# Patient Record
Sex: MALE | URBAN - METROPOLITAN AREA
[De-identification: names, ages, dates, MRNs, and addresses within clinical notes are randomized per-mention and may not be internally consistent; named-entity substitution may affect disease eponyms.]

---

## 2022-01-10 ENCOUNTER — ATHLETIC TRAINING (OUTPATIENT)
Dept: SPORTS MEDICINE | Facility: OTHER | Age: 22
End: 2022-01-10

## 2022-01-10 DIAGNOSIS — M25.551 HIP PAIN, ACUTE, RIGHT: Primary | ICD-10-CM

## 2022-01-10 NOTE — PROGRESS NOTES
A: Right gluteus medius spasm  S: Pt  Has a past history of low back pain and surgery to repair a disc herniation  O: Completed MMT of GM, abdominals, hip flexor  Left gluteus medius was weaker than Right  - well leg test  No numbness or tingling  No mechanism of injury  P: Advised to completed three exercises to alleviate soreness on the Right side  He will complete exercises more regularly as he is returning to full participation    LB ATC

## 2022-07-12 ENCOUNTER — ATHLETIC TRAINING (OUTPATIENT)
Dept: SPORTS MEDICINE | Facility: OTHER | Age: 22
End: 2022-07-12

## 2022-07-12 DIAGNOSIS — M25.532 LEFT WRIST PAIN: Primary | ICD-10-CM

## 2022-07-12 NOTE — PROGRESS NOTES
Athletic Training Wrist/Hand Evaluation    Name: Rocael Tejeda  Age: 20 y o  Sport: Fittr   Date of Assessment: 7/12/2022    Assessment/Plan:     Visit Diagnosis: Left wrist pain, Scaphoid injury     Treatment Plan: Ath will go get x-rays and update AT on results  He will continue treatment  []  Follow-up PRN  []  Follow-up prior to next practice/game for re-evaluation  [x]  Daily treatment/rehab  Progress note expected weekly  Referral:   []  Not needed at this time  [x]  Referred to: Urgent care for x-ray   [x]  Coaching staff notified    Subjective: Ath was working a [employer] camp when he went up for a dunk and fell down on his left wrist in a extended position ([address])  He denies any popping or clicking noise, but was not able to return to play  He iced and compressed on and off throughout the night  Ath stated he noticed swelling immediately after the injury, but no discoloration   Ath was able to sleep last night, but states he has a painful ache at rest      Date of Injury: 7/11/22    Injury occurred during:   []  Practice  []  Competition  [x]  Other: Camp    Mechanism: [address]    Previous History: None    Reported Symptoms:     [] Hyperextension [] Numbness or tingling   [] Hyperflexion [] Weakness   [] Snapping sensation [] Grinding   [] Felt pop [] Sharp pain   [x] Pain with rest [] Burning   [x] Pain with activity [x] Dull or achy   [x] Loss of motion       Objective:    Observation:     []  No observable findings compared bilaterally    [x] Swelling [] Jersey finger   [] Ecchymosis [] Mallet finger   [] Atrophy [] Abnormal contours   [] Callous or blister [] Nail abnormality   [] Deformity [] Subungual hematoma   [] Boutonniere deformity [] Ingrown nail   [] [place] neck deformity [] Laceration     Palpation: TTP scaphoid, radial styloid process, and distal radius on palmar and dorsal aspect    Active Range of Motion:      Full  ROM Limited  ROM Pain  with  ROM No  Motion   Wrist Flexion [] [x] [] []   Wrist Extension [] [x] [] []   Pronation [] [] [x] []   Supination [] [] [x] []   Radial Deviation [] [] [x] []   Ulnar Deviation [] [x] [] []   Thumb Flexion [] [] [x] []   Thumb Extension [] [] [x] []   Thumb Abduction [] [] [x] []   Thumb Adduction [] [] [x] []   MP Flexion [x] [] [] []   MP Extension [x] [] [] []   PIP Flexion [x] [] [] []   PIP Extension [x] [] [] []   DIP Flexion [x] [] [] []   DIP Extension [x] [] [] []     Manual Muscle Tests:   Full ROM of fingers   No pain in fingers   Moves whole arm to supinate pronate    Not performed [x] Due to pain             5 4+ 4 4- 3 or  Under   Wrist Flexion [] [] [] [] []   Wrist Extension [] [] [] [] []   Pronation [] [] [] [] []   Supination [] [] [] [] []   Radial Deviation [] [] [] [] []   Ulnar Deviation [] [] [] [] []   Thumb Flexion [] [] [] [] []   Thumb Extension [] [] [] [] []   Thumb Abduction [] [] [] [] []   Thumb Adduction [] [] [] [] []   MP Flexion [] [] [] [] []   MP Extension [] [] [] [] []   PIP Flexion [] [] [] [] []   PIP Extension [] [] [] [] []   DIP Flexion [] [] [] [] []   DIP Extension [] [] [] [] []     Special Tests: Unable to complete majority of tests due to pain      (+)  Laxity (+)  Pain (-)  WNL Not  Tested   Compression [] [x] [] []   Distraction [] [] [] []   Percussion [] [] [] []   Tuning Fork [] [] [x] []   Valgus Stress [] [] [] []   Varus Stress [] [] [] []   Wrist Glide [] [] [] []   Tinel's [] [] [] []   Phalen's [] [] [] []   Reverse Phalen's [] [] [] []   Finkelstein's [] [] [] []   Thomas Scaphoid Shift [] [] [] []   Triangular Fibrocartilage [] [] [] []   Lunotriquetrial Shear [] [] [] []     Treatment Log:     Date: 7/12/22   Playing Status: Out       Exercise/Treatment    X-ray

## 2022-07-22 ENCOUNTER — ATHLETIC TRAINING (OUTPATIENT)
Dept: SPORTS MEDICINE | Facility: OTHER | Age: 22
End: 2022-07-22

## 2022-07-22 DIAGNOSIS — M25.532 LEFT WRIST PAIN: Primary | ICD-10-CM

## 2022-07-26 NOTE — PROGRESS NOTES
Athletic Training Progress Note    Name: Martha Santos  Age: 20 y o  Assessment/Plan:     Visit Diagnosis: Left wrist pain [M25 532]    Treatment Plan: ROM, Decrease Pain and Swelling    []  Follow-up PRN  []  Follow-up prior to next practice/game for re-evaluation  [x]  Daily treatment/rehab  Progress note expected weekly  Subjective: Pt reported to the [facility] today with their results from getting an x-ray  X-ray was negative but may have a hidden scaphoid fx  Pt stated he was given a brace to wear but has not been able to wear it due to work  Pt stated his ROM has gotten better but is still limited  Objective:   See treatment log below  Provided compression sleeve to wear at work since they are unable to wear the brace  Treatment Log:      Date: 7/22/22       Playing Status: Limited               Exercise/Treatment        Rice Bucket ROM x50                                                                                         Pt should complete 1 week of rehab and treatment and then re-evaluated and getting another X-ray if symptoms have not improved    CY LAT ATC

## 2022-07-27 ENCOUNTER — ATHLETIC TRAINING (OUTPATIENT)
Dept: SPORTS MEDICINE | Facility: OTHER | Age: 22
End: 2022-07-27

## 2022-07-27 DIAGNOSIS — S66.912D MUSCLE STRAIN OF LEFT WRIST, SUBSEQUENT ENCOUNTER: ICD-10-CM

## 2022-07-27 DIAGNOSIS — M25.532 LEFT WRIST PAIN: Primary | ICD-10-CM

## 2022-07-27 NOTE — PROGRESS NOTES
Name: Ale Blanca  Age: 20 y o  Assessment/Plan:     Visit Diagnosis: No primary diagnosis found  Treatment Plan: ROM, Decrease Pain and Swelling    []  Follow-up PRN  []  Follow-up prior to next practice/game for re-evaluation  [x]  Daily treatment/rehab  Progress note expected weekly  Subjective: Pt reported to the [facility] today with their results from getting an x-ray  X-ray was negative but may have a hidden scaphoid fx  Pt stated he was given a brace to wear but has not been able to wear it due to work  Pt stated his ROM has gotten better but is still limited  Objective:   See treatment log below  Provided compression sleeve to wear at work since they are unable to wear the brace  Treatment Log:      Date: 7/22/22       Playing Status: Limited               Exercise/Treatment        Rice Bucket ROM x50       MWM Flexion 3x4       MWM Wall pushup 1x5                                                                         Pt should complete 1 week of rehab and treatment and then re-evaluated and getting another X-ray if symptoms have not improved  CY LAT ATC      Pain decreased in wall pushup from 4 to 1 NRS    LB ATC 7/27/22

## 2022-08-05 ENCOUNTER — ATHLETIC TRAINING (OUTPATIENT)
Dept: SPORTS MEDICINE | Facility: OTHER | Age: 22
End: 2022-08-05

## 2022-08-05 DIAGNOSIS — S66.912D MUSCLE STRAIN OF LEFT WRIST, SUBSEQUENT ENCOUNTER: ICD-10-CM

## 2022-08-05 DIAGNOSIS — M25.532 LEFT WRIST PAIN: Primary | ICD-10-CM

## 2022-08-05 NOTE — PROGRESS NOTES
Name: Helen Stout  Age: 20 y o  Assessment/Plan:     Visit Diagnosis: No primary diagnosis found  Treatment Plan: ROM, Decrease Pain and Swelling    []  Follow-up PRN  []  Follow-up prior to next practice/game for re-evaluation  [x]  Daily treatment/rehab  Progress note expected weekly  Subjective: Pt reported to the [facility] today with their results from getting an x-ray  X-ray was negative but may have a hidden scaphoid fx  Pt stated he was given a brace to wear but has not been able to wear it due to work  Pt stated his ROM has gotten better but is still limited  Objective:   See treatment log below  Provided compression sleeve to wear at work since they are unable to wear the brace  Treatment Log:      Date: 7/22/22 8/5/22      Playing Status: Limited As Tolerated no games              Exercise/Treatment        Rice Bucket ROM x50 x50      MWM Flexion 3x4       MWM Wall pushup 1x5       Finger lifts off wall  x10      Finger flexion to wall  x5                                                        Pt should complete 1 week of rehab and treatment and then re-evaluated and getting another X-ray if symptoms have not improved  CY LAT ATC      Pain decreased in wall pushup from 4 to 1 NRS  LB ATC 7/27/22      NRS with Pushup on ground 3 wall 2 NRS  He is not concerned about getting a MRI now    LB ATC 8/5/22

## 2022-08-10 ENCOUNTER — ATHLETIC TRAINING (OUTPATIENT)
Dept: SPORTS MEDICINE | Facility: OTHER | Age: 22
End: 2022-08-10

## 2022-08-10 DIAGNOSIS — S66.912D MUSCLE STRAIN OF LEFT WRIST, SUBSEQUENT ENCOUNTER: ICD-10-CM

## 2022-08-10 DIAGNOSIS — M25.532 LEFT WRIST PAIN: Primary | ICD-10-CM

## 2022-08-11 NOTE — PROGRESS NOTES
Athletic Training Progress Note    Name: Stephanie Garcia  Age: 20 y o  Assessment/Plan:     Visit Diagnosis: Left wrist pain [M25 532]    Treatment Plan: Plan RTP based on one on one practice    []  Follow-up PRN  [x]  Follow-up prior to next practice/game for re-evaluation  []  Daily treatment/rehab  Progress note expected weekly  Subjective: Pt would like to play on Monday 8/15 in summer ball game  He will play one on one before that  Objective:   See treatment log below  0 NRS with pushup  Full motion    Treatment Log:    Date: 7/22/22 8/5/22 8/10     Playing Status: Limited As Tolerated no games As tolerated             Exercise/Treatment        Rice Bucket ROM x50 x50 x50     MWM Flexion 3x4       MWM Wall pushup 1x5       Finger lifts off wall  x10      Finger flexion to wall  x5                                                        Pt should complete 1 week of rehab and treatment and then re-evaluated and getting another X-ray if symptoms have not improved  CY LAT ATC    Name: Stephanie Garcia  Age: 20 y o  Assessment/Plan:     Visit Diagnosis: Left wrist pain [M25 532]    Treatment Plan: ROM, Decrease Pain and Swelling    []  Follow-up PRN  []  Follow-up prior to next practice/game for re-evaluation  [x]  Daily treatment/rehab  Progress note expected weekly  Subjective: Pt reported to the [facility] today with their results from getting an x-ray  X-ray was negative but may have a hidden scaphoid fx  Pt stated he was given a brace to wear but has not been able to wear it due to work  Pt stated his ROM has gotten better but is still limited  Objective:   See treatment log below  Provided compression sleeve to wear at work since they are unable to wear the brace    Treatment Log:      Date: 7/22/22 8/5/22      Playing Status: Limited As Tolerated no games              Exercise/Treatment        Rice Bucket ROM x50 x50      MWM Flexion 3x4       MWM Wall pushup 1x5       Finger lifts off wall  x10 Finger flexion to wall  x5                                                        Pt should complete 1 week of rehab and treatment and then re-evaluated and getting another X-ray if symptoms have not improved  CY LAT ATC      Pain decreased in wall pushup from 4 to 1 NRS  LB ATC 7/27/22      NRS with Pushup on ground 3 wall 2 NRS  He is not concerned about getting a MRI now     ATC 8/5/22

## 2022-08-16 ENCOUNTER — ATHLETIC TRAINING (OUTPATIENT)
Dept: SPORTS MEDICINE | Facility: OTHER | Age: 22
End: 2022-08-16

## 2022-08-16 DIAGNOSIS — M62.830 SPASM OF MUSCLE OF LOWER BACK: ICD-10-CM

## 2022-08-16 DIAGNOSIS — M54.41 ACUTE RIGHT-SIDED LOW BACK PAIN WITH RIGHT-SIDED SCIATICA: Primary | ICD-10-CM

## 2022-08-16 NOTE — PROGRESS NOTES
Athletic Training Back Evaluation    Name: Nanda Antony  Age: 20 y o  Date of Assessment: 8/16/2022    Assessment/Plan:     Visit Diagnosis: Acute right-sided low back pain with right-sided sciatica [M54 41]    Treatment Plan: Decrease pain and work to mobilize lumbar spine  []  Follow-up PRN  []  Follow-up prior to next practice/game for re-evaluation  [x]  Daily treatment/rehab  Progress note expected weekly  Referral:     [x]  Not needed at this time  []  Referred to:     [x]  Coaching staff notified  []  Parent/Guardian Notified    Subjective:    Date of Injury: 8/14/22    Injury occurred during:     []  Practice  []  Competition  [x]  Other:     Mechanism: No known    Previous History: Back surgery, narrowing of vertabre    Reported Symptoms:     [x] Pain with rest [] Numbness or tingling   [x] Pain with activity [] Radiating pain   [x] Pain with sleeping [] Weakness   [] Sharp pain [] Loss of motion   [x] Dull pain [] Twisted   [] Pressure [] Felt/heard a pop   [] Burning [] [place]/heard a crack     Objective:    Observation:     []  No observable findings compared bilaterally    [] Swelling [] Pelvic tilt   [] Deformity [] Spine curvature   [] Ecchymosis [] Winged scapula   [x] Muscle spasm [] Abnormal posture   [] Abnormal gait [] Atrophy     Palpation: TTP: Right SI and cause shooting pain      Active Range of Motion:      Full  ROM Limited  ROM Pain  with  ROM No  Motion   Trunk Flexion  [] [x] [] []   Trunk Extension [] [x] [] []   Lateral Flexion (Left) [] [] [] []   Lateral Flexion (Right) [] [] [] []   Trunk Rotation (Left) [] [] [] []   Trunk Rotation (Right) [] [] [] []   Hip Flexion [] [] [] []   Hip Extension [] [] [] []     Manual Muscle Tests:     Not performed []             5 4+ 4 4- 3 or  Under   Trunk Flexion  [] [] [] [] []   Trunk Extension [] [] [] [] []   Lateral Flexion (Left) [] [] [] [] []   Lateral Flexion (Right) [] [] [] [] []   Trunk Rotation (Left) [] [] [] [] []   Trunk Rotation (Right) [] [] [] [] []   Hip Flexion [] [] [] [] []   Hip Extension [] [] [] [] []     Special Tests:      (+)  POS (-)  NEG Not  Tested   Spring Test [] [] []   Straight Leg Raise [] [x] []   Valsalva [] [x] []   Milgram's [] [] []   Kernig's/Brudzinski's []  [] []   Slump [] [x] []   Quadrant [] [] []   Bowstring [] [x] []   SI Compression/Distraction [] [] []   ALEA [] [] []   Long Sit Test [] [] []   Trendelenberg's  [] [] []   Hernandez [] [] []     Lower Quarter Screen:  []  WNL  []  Abnormal    Treatment Log:     Date: 8/16   Playing Status: OUT       Exercise/Treatment    MHP x15'   Estim Premod x15'                                       LB ATC

## 2022-08-17 ENCOUNTER — ATHLETIC TRAINING (OUTPATIENT)
Dept: SPORTS MEDICINE | Facility: OTHER | Age: 22
End: 2022-08-17

## 2022-08-17 DIAGNOSIS — S66.912D MUSCLE STRAIN OF LEFT WRIST, SUBSEQUENT ENCOUNTER: ICD-10-CM

## 2022-08-17 DIAGNOSIS — M54.41 ACUTE RIGHT-SIDED LOW BACK PAIN WITH RIGHT-SIDED SCIATICA: Primary | ICD-10-CM

## 2022-08-17 DIAGNOSIS — M25.532 LEFT WRIST PAIN: Primary | ICD-10-CM

## 2022-08-17 DIAGNOSIS — M62.830 SPASM OF MUSCLE OF LOWER BACK: ICD-10-CM

## 2022-08-18 NOTE — PROGRESS NOTES
Athletic Training Back Evaluation    Name: Leeanne Velasco  Age: 20 y o  Date of Assessment: 8/17/2022    Assessment/Plan:     Visit Diagnosis: Acute right-sided low back pain with right-sided sciatica [M54 41]    Treatment Plan: Decrease pain and work to mobilize lumbar spine  []  Follow-up PRN  []  Follow-up prior to next practice/game for re-evaluation  [x]  Daily treatment/rehab  Progress note expected weekly  Referral:     [x]  Not needed at this time  []  Referred to:     [x]  Coaching staff notified  []  Parent/Guardian Notified    Subjective:    Date of Injury: 8/14/22    Injury occurred during:     []  Practice  []  Competition  [x]  Other:     Mechanism: No known    Previous History: Back surgery, narrowing of vertabre    Reported Symptoms:     [x] Pain with rest [] Numbness or tingling   [x] Pain with activity [] Radiating pain   [x] Pain with sleeping [] Weakness   [] Sharp pain [] Loss of motion   [x] Dull pain [] Twisted   [] Pressure [] Felt/heard a pop   [] Burning [] [place]/heard a crack     Objective:    Observation:     []  No observable findings compared bilaterally    [] Swelling [] Pelvic tilt   [] Deformity [] Spine curvature   [] Ecchymosis [] Winged scapula   [x] Muscle spasm [] Abnormal posture   [] Abnormal gait [] Atrophy     Palpation: TTP: Right SI and cause shooting pain      Active Range of Motion:      Full  ROM Limited  ROM Pain  with  ROM No  Motion   Trunk Flexion  [] [x] [] []   Trunk Extension [] [x] [] []   Lateral Flexion (Left) [] [] [] []   Lateral Flexion (Right) [] [] [] []   Trunk Rotation (Left) [] [] [] []   Trunk Rotation (Right) [] [] [] []   Hip Flexion [] [] [] []   Hip Extension [] [] [] []     Manual Muscle Tests:     Not performed []             5 4+ 4 4- 3 or  Under   Trunk Flexion  [] [] [] [] []   Trunk Extension [] [] [] [] []   Lateral Flexion (Left) [] [] [] [] []   Lateral Flexion (Right) [] [] [] [] []   Trunk Rotation (Left) [] [] [] [] []   Trunk Rotation (Right) [] [] [] [] []   Hip Flexion [] [] [] [] []   Hip Extension [] [] [] [] []     Special Tests:      (+)  POS (-)  NEG Not  Tested   Spring Test [] [] []   Straight Leg Raise [] [x] []   Valsalva [] [x] []   Milgram's [] [] []   Kernig's/Brudzinski's []  [] []   Slump [] [x] []   Quadrant [] [] []   Bowstring [] [x] []   SI Compression/Distraction [] [] []   ALEA [] [] []   Long Sit Test [] [] []   Trendelenberg's  [] [] []   Hernandez [] [] []     Lower Quarter Screen:  []  WNL  []  Abnormal    Treatment Log:     Date: 8/16   Playing Status: OUT       Exercise/Treatment    MHP x15'   Estim Premod x15'                                       LB ATC    Athletic Training Progress Note    Name: Bartolo Sprague  Age: 20 y o  Assessment/Plan:     Visit Diagnosis: Acute right-sided low back pain with right-sided sciatica [M54 41]    Treatment Plan: Improve lumbopelvic [facility]    []  Follow-up PRN  []  Follow-up prior to next practice/game for re-evaluation  [x]  Daily treatment/rehab  Progress note expected weekly  Subjective: Pt states that his back feels much looser after treatment yesterday  He was going to lift after treatment to see how it feels      Objective:   See treatment log below    Treatment Log:     Date: 8/17/22       Playing Status: As tolerated               Exercise/Treatment        DNS elbows 2x30"       DNS knees up 2x30"       Premod  1-100mhz 13'       MHP 13'                                                                 Date: 8/16   Playing Status: OUT       Exercise/Treatment    MHP x15'   Estim Premod x15'                                       LB ATC

## 2022-08-18 NOTE — PROGRESS NOTES
8/17/22  Pt was able to compete fully during the summer league game he wanted to participate in on 8/15/22    LB ATC

## 2022-08-20 ENCOUNTER — ATHLETIC TRAINING (OUTPATIENT)
Dept: SPORTS MEDICINE | Facility: OTHER | Age: 22
End: 2022-08-20

## 2022-08-20 DIAGNOSIS — M54.41 ACUTE RIGHT-SIDED LOW BACK PAIN WITH RIGHT-SIDED SCIATICA: Primary | ICD-10-CM

## 2022-08-20 DIAGNOSIS — M62.830 SPASM OF MUSCLE OF LOWER BACK: ICD-10-CM

## 2022-08-20 NOTE — PROGRESS NOTES
Athletic Training Back Evaluation    Name: Mena Stoner  Age: 20 y o  Date of Assessment: 8/20/2022    Assessment/Plan:     Visit Diagnosis: Acute right-sided low back pain with right-sided sciatica [M54 41]    Treatment Plan: Decrease pain and work to mobilize lumbar spine  []  Follow-up PRN  []  Follow-up prior to next practice/game for re-evaluation  [x]  Daily treatment/rehab  Progress note expected weekly  Referral:     [x]  Not needed at this time  []  Referred to:     [x]  Coaching staff notified  []  Parent/Guardian Notified    Subjective:    Date of Injury: 8/14/22    Injury occurred during:     []  Practice  []  Competition  [x]  Other:     Mechanism: No known    Previous History: Back surgery, narrowing of vertabre    Reported Symptoms:     [x] Pain with rest [] Numbness or tingling   [x] Pain with activity [] Radiating pain   [x] Pain with sleeping [] Weakness   [] Sharp pain [] Loss of motion   [x] Dull pain [] Twisted   [] Pressure [] Felt/heard a pop   [] Burning [] [place]/heard a crack     Objective:    Observation:     []  No observable findings compared bilaterally    [] Swelling [] Pelvic tilt   [] Deformity [] Spine curvature   [] Ecchymosis [] Winged scapula   [x] Muscle spasm [] Abnormal posture   [] Abnormal gait [] Atrophy     Palpation: TTP: Right SI and cause shooting pain      Active Range of Motion:      Full  ROM Limited  ROM Pain  with  ROM No  Motion   Trunk Flexion  [] [x] [] []   Trunk Extension [] [x] [] []   Lateral Flexion (Left) [] [] [] []   Lateral Flexion (Right) [] [] [] []   Trunk Rotation (Left) [] [] [] []   Trunk Rotation (Right) [] [] [] []   Hip Flexion [] [] [] []   Hip Extension [] [] [] []     Manual Muscle Tests:     Not performed []             5 4+ 4 4- 3 or  Under   Trunk Flexion  [] [] [] [] []   Trunk Extension [] [] [] [] []   Lateral Flexion (Left) [] [] [] [] []   Lateral Flexion (Right) [] [] [] [] []   Trunk Rotation (Left) [] [] [] [] []   Trunk Rotation (Right) [] [] [] [] []   Hip Flexion [] [] [] [] []   Hip Extension [] [] [] [] []     Special Tests:      (+)  POS (-)  NEG Not  Tested   Spring Test [] [] []   Straight Leg Raise [] [x] []   Valsalva [] [x] []   Milgram's [] [] []   Kernig's/Brudzinski's []  [] []   Slump [] [x] []   Quadrant [] [] []   Bowstring [] [x] []   SI Compression/Distraction [] [] []   ALEA [] [] []   Long Sit Test [] [] []   Trendelenberg's  [] [] []   Hernandez [] [] []     Lower Quarter Screen:  []  WNL  []  Abnormal    Treatment Log:     Date: 8/16   Playing Status: OUT       Exercise/Treatment    MHP x15'   Estim Premod x15'                                       LB ATC    Athletic Training Progress Note    Name: Mar Bell  Age: 20 y o  Assessment/Plan:     Visit Diagnosis: Acute right-sided low back pain with right-sided sciatica [M54 41]    Treatment Plan: Improve lumbopelvic [facility]    []  Follow-up PRN  []  Follow-up prior to next practice/game for re-evaluation  [x]  Daily treatment/rehab  Progress note expected weekly  Subjective: Pt states that his back feels much looser after treatment yesterday  He was going to lift after treatment to see how it feels  Objective:   See treatment log below    Treatment Log:     Date: 8/20/22 8/17/22       Playing Status: As Tolerated As tolerated                Exercise/Treatment         DNS elbows 3x10" 2x30"       DNS knees up 3x10" 2x30"       Premod  13' 1-100mhz 13'       MHP 13' 13'       Rolling patterns 3x10"                                                                Date: 8/16   Playing Status: OUT       Exercise/Treatment    MHP x15'   Estim Premod x15'                                       LB ATC     8/20  Would like to reevaluate Special tests and Athlete Low Back Outcome    LB ATC

## 2022-11-08 ENCOUNTER — ATHLETIC TRAINING (OUTPATIENT)
Dept: SPORTS MEDICINE | Facility: OTHER | Age: 22
End: 2022-11-08

## 2022-11-08 DIAGNOSIS — M76.62 ACHILLES TENDINITIS OF LEFT LOWER EXTREMITY: Primary | ICD-10-CM

## 2022-11-09 NOTE — PROGRESS NOTES
Athletic Training Foot/Ankle Evaluation    Name: Yimi Palacio  Age: 20 y o    School District: [address] [place] [address]   Sport: Basketball  Date of Assessment: 11/8/2022    Assessment/Plan:     Visit Diagnosis: Achilles tendinitis of left lower extremity [M76 62]    Treatment Plan:     [x]  Follow-up PRN  []  Follow-up prior to next practice/game for re-evaluation  []  Daily treatment/rehab  Progress note expected weekly       Referral:     []  Not needed at this time  []  Referred to:     [x]  Coaching staff notified  []  Parent/Guardian Notified    Subjective:    Date of Injury: 11/8    Injury occurred during:     []  Practice  []  Competition  []  Other: Overuse     Mechanism: Overuse    Previous History: Previous ankle sprains    Reported Symptoms: Pain with full ankle dorsiflexion and jumping/landing    [] Felt pop [] Weakness   [] Cracking or snapping [] Grinding   [] Twisted [] Sharp pain   [] Pain with rest [] Burning   [x] Pain with activity [] Dull or achy   [] Pain with stairs [] Felt give way   [] Numbness or tingling [] Loss of motion     Objective:    Observation:     [x]  No observable findings compared bilaterally    [] Swelling [] Callous or blister   [] Ecchymosis [] Nail abnormality   [] Redness [] Ingrown nail   [] Deformity [] Bunion formation   [] Abnormal gait [] Pes planus   [] Pitting edema [] Pes cavus   [] Open wound [] Atrophy     Palpation: Not TTP     Active Range of Motion:      Full  ROM Limited  ROM Pain  with  ROM No  Motion   Dorsiflexion [] [x] [] []   Plantarflexion [x] [] [] []   Inversion [x] [] [] []   Eversion [x] [] [] []   Great Toe Flexion [] [] [] []   Great Toe Extension [] [] [] []   Toe Flexion [] [] [] []   Toe Extension [] [] [] []     Manual Muscle Tests:     Not performed []             5 4+ 4 4- 3 or  Under   Dorsiflexion [x] [] [] [] []   Plantarflexion [x] [] [] [] []   Inversion [x] [] [] [] []   Eversion [x] [] [] [] []   Great Toe Flexion [] [] [] [] []   CHARLY Graf Toe Extension [] [] [] [] []   Toe Flexion [] [] [] [] []   Toe Extension [] [] [] [] []     Special Tests:      (+)  Laxity (+)  Pain (-)  WNL Not  Tested   Bump [] [] [] []   Squeeze [] [] [] []   Percussion [] [] [] []   Tuning Fork [] [] [] []   Anterior Drawer [] [] [] []   Posterior Drawer [] [] [] []   Talar Tilt - Inversion [] [] [] []   Talar Tilt - Eversion [] [] [] []   Kleiger [] [] [] []   Toe Compression [] [] [] []   Toe Distraction [] [] [] []   MTP Valgus [] [] [] []   MTP Varus [] [] [] []   Intermetatarsal Glide [] [] [] []   Tarsometatarsal Glide [] [] [] []   Tinel's [] [] [] []   Impingement Sign [] [] [] []   Solo's (Achilles) [] [] [x] []   Umm's Sign (DVT) [] [] [] []   Interdigital Neuroma [] [] [] []   Navicular Drop [] [] [] []     Treatment Log:    Date: 11/8   Playing Status: Full Go       Exercise/Treatment    Heat 5'   Standing Calf raises 3x12   Isolytic contractions 3x8   IASTM 5 mins No

## 2022-11-10 ENCOUNTER — ATHLETIC TRAINING (OUTPATIENT)
Dept: SPORTS MEDICINE | Facility: OTHER | Age: 22
End: 2022-11-10

## 2022-11-10 DIAGNOSIS — M76.62 ACHILLES TENDINITIS OF LEFT LOWER EXTREMITY: Primary | ICD-10-CM

## 2022-12-02 NOTE — PROGRESS NOTES
11/10:  Pt has not completed rehabilitation for his achilles  He completes all basketball activities as tolerated    CM

## 2022-12-18 ENCOUNTER — ATHLETIC TRAINING (OUTPATIENT)
Dept: SPORTS MEDICINE | Facility: OTHER | Age: 22
End: 2022-12-18

## 2022-12-18 DIAGNOSIS — M62.89 QUADRICEP TIGHTNESS: Primary | ICD-10-CM

## 2022-12-19 NOTE — PROGRESS NOTES
Athletic Training Progress Note    Name: Sandie Magdaleno  Age: 20 y o  Assessment/Plan:     Visit Diagnosis: Quadricep tightness [M62 89]    Treatment Plan:    [x]  Follow-up PRN  []  Follow-up prior to next practice/game for re-evaluation  []  Daily treatment/rehab  Progress note expected weekly  Subjective: Athlete states the he is experiencing pain hear his near  Has no KHAI that he can remember  Describes his discomfort as "tight"  Objective:   Not TTP and full strength  All negative special tests  Treatment Log:  PT was advised to make appts   To limit his tightness and discomfort during participation   Date:        Playing Status:                Exercise/Treatment

## 2023-04-30 ENCOUNTER — ATHLETIC TRAINING (OUTPATIENT)
Dept: SPORTS MEDICINE | Facility: OTHER | Age: 23
End: 2023-04-30

## 2023-04-30 DIAGNOSIS — S05.31XA: Primary | ICD-10-CM

## 2023-05-01 NOTE — PROGRESS NOTES
"4/20: S: pt states that he was poked in the eye 2 days ago playing  basketball  He was advised to stop playing if he was having vision problems  He continued to play and lifted after the injury  He did not seek further care  He states that he was unable to sleep last night due to his eye  O: Pt presents today with eye redness on the left side of his eye  He states that he has full vision but his eye almendarez \"all of the time\"  He states that touching his eye makes it feel better  A: Eye Laceration  P: Pt was told to seek further medical evaluation  He states that he does not have medical insurance now that the basketball season has ended  He states that he will seek further medical evaluation today  He was told to limit the amount that  He touches his eye to avoid potential infection  He was also told to wear sun glasses  He will send AT an email this evening to follow-up on what dr Ru You and prescribes  CM     4/30:  Pt visited his PCP who gave him some \"cream\" to put on his eye  He has not followed up care with AT staff and has been playing basketball fully    CM  "

## 2023-05-04 ENCOUNTER — ATHLETIC TRAINING (OUTPATIENT)
Dept: SPORTS MEDICINE | Facility: OTHER | Age: 23
End: 2023-05-04

## 2023-05-04 DIAGNOSIS — M76.899 HAMSTRING TENDONITIS: Primary | ICD-10-CM

## 2023-05-04 NOTE — PROGRESS NOTES
Athletic Training Knee Evaluation    Name: Chely Leroy  Age: 24 y o    School District: [employer]  Sport: Basketball  Date of Assessment: 5/4/2023    Assessment/Plan:     Visit Diagnosis: Hamstring tendonitis [M76 899]    Treatment Plan:     []  Follow-up PRN  []  Follow-up prior to next practice/game for re-evaluation  [x]  Daily treatment/rehab  Progress note expected weekly       Referral:     [x]  Not needed at this time  []  Referred to:     []  Coaching staff notified  []  Parent/Guardian Notified    Subjective:    Date of Injury: 5/3/23    Injury occurred during:     []  Practice  []  Competition  [x]  Other:     Mechanism: Felt pain after a jump    Previous History: N/a    Reported Symptoms:     [] Felt pop [] Grinding   [] Vergia Bias a pop [] Pressure   [x] Pain with rest [] Burning   [x] Pain with activity [] Weakness   [x] Pain with stairs [] Loss of motion   [x] Sharp pain [] Clicking   [] Dull pain [] Snapping sensation   [] Radiating pain [] Locking   [] Felt give way       Objective:    Observation:     [x]  No observable findings compared bilaterally    [] Swelling [] Genu recurvatum   [] Effusion [] Genu valgum   [] Deformity [] Genu varus   [] Ecchymosis [] Patella kyle   [] Abnormal gait [] Patella baja   [] Atrophy [] Squinting patellae   [] Muscle spasm [] Frog eye patellae     Palpation: TTP along fibular head     Active Range of Motion:      Full  ROM Limited  ROM Pain  with  ROM No  Motion   Knee Flexion [x] [] [] []   Knee Extension [x] [] [] []   Hip Flexion [] [] [] []   Hip Extension [] [] [] []   Hip Abduction [] [] [] []   Hip Adduction [] [] [] []   Dorsiflexion [x] [] [] []   Plantarflexion [x] [] [x] []     Manual Muscle Tests:     Not performed []             5 4+ 4 4- 3 or  Under   Knee Flexion [x] [] [] [] []   Knee Extension [x] [] [] [] []   Hip Flexion [] [] [] [] []   Hip Extension [] [] [] [] []   Hip Abduction [] [] [] [] []   Hip Adduction [] [] [] [] [] Dorsiflexion [x] [] [] [] []   Plantarflexion [] [x] [] [] []     Special Tests:      (+)  Laxity (+)  Pain (-)  WNL Not  Tested   Lachman [] [] [x] []   Anterior Drawer [] [] [x] []   Pivot Shift [] [] [] [x]   Posterior Drawer [] [] [x] []   Sag [] [] [] [x]   Valgus (0 Degrees) [] [] [x] []   Valgus (30 Degrees) [] [] [x] []   Varus (0 Degrees) [] [] [x] []   Varus (30 Degrees) [] [] [x] []   Nimo [] [] [] [x]   Thessally's [] [] [] [x]   Apley's [] [] [] [x]   Jamee's [] [] [] [x]   Patellar Apprehension [] [] [] [x]   Patellar Glide [] [] [] [x]   Ballotable Patella  [] [] [] [x]     Treatment Log:     Date: 5/4/23   Playing Status: As Tolerated        Exercise/Treatment    Heat  20 min                                              Made a plan with pt to check back in on Monday   CP ATS

## 2023-09-01 ENCOUNTER — ATHLETIC TRAINING (OUTPATIENT)
Dept: SPORTS MEDICINE | Facility: OTHER | Age: 23
End: 2023-09-01

## 2023-09-01 DIAGNOSIS — M54.50 CHRONIC BILATERAL LOW BACK PAIN WITHOUT SCIATICA: Primary | ICD-10-CM

## 2023-09-01 DIAGNOSIS — G89.29 CHRONIC BILATERAL LOW BACK PAIN WITHOUT SCIATICA: Primary | ICD-10-CM

## 2023-09-06 ENCOUNTER — ATHLETIC TRAINING (OUTPATIENT)
Dept: SPORTS MEDICINE | Facility: OTHER | Age: 23
End: 2023-09-06

## 2023-09-06 DIAGNOSIS — G89.29 CHRONIC BILATERAL LOW BACK PAIN WITHOUT SCIATICA: Primary | ICD-10-CM

## 2023-09-06 DIAGNOSIS — M54.50 CHRONIC BILATERAL LOW BACK PAIN WITHOUT SCIATICA: Primary | ICD-10-CM

## 2023-09-07 NOTE — PROGRESS NOTES
9/6:  Pt completed general stretching exercises and electrical stimulation for generalized low back pain.   CM     9/1  Pt got heat for his back without an eval.  LB ATC

## 2023-09-11 ENCOUNTER — ATHLETIC TRAINING (OUTPATIENT)
Dept: SPORTS MEDICINE | Facility: OTHER | Age: 23
End: 2023-09-11

## 2023-09-11 DIAGNOSIS — M54.50 CHRONIC BILATERAL LOW BACK PAIN WITHOUT SCIATICA: Primary | ICD-10-CM

## 2023-09-11 DIAGNOSIS — G89.29 CHRONIC BILATERAL LOW BACK PAIN WITHOUT SCIATICA: Primary | ICD-10-CM

## 2023-09-12 NOTE — PROGRESS NOTES
9/11:  Pt completed:  - Dead bugs x60  - Jasmeet pose x5mins  - Side bending exercises  - Electrical stimulation. CM    9/6:  Pt completed general stretching exercises and electrical stimulation for generalized low back pain.   CM      9/1  Pt got heat for his back without an eval.  LB ATC

## 2023-09-15 ENCOUNTER — ATHLETIC TRAINING (OUTPATIENT)
Dept: SPORTS MEDICINE | Facility: OTHER | Age: 23
End: 2023-09-15

## 2023-09-15 DIAGNOSIS — M54.50 CHRONIC BILATERAL LOW BACK PAIN WITHOUT SCIATICA: Primary | ICD-10-CM

## 2023-09-15 DIAGNOSIS — G89.29 CHRONIC BILATERAL LOW BACK PAIN WITHOUT SCIATICA: Primary | ICD-10-CM

## 2023-09-15 NOTE — PROGRESS NOTES
9/14:  Pt did not come to scheduled rehabilitation session. CM    9/11:  Pt completed:  - Dead bugs x60  - Jasmeet pose x5mins  - Side bending exercises  - Electrical stimulation. CM     9/6:  Pt completed general stretching exercises and electrical stimulation for generalized low back pain.   CM      9/1  Pt got heat for his back without an eval.  LB ATC

## 2023-10-11 ENCOUNTER — ATHLETIC TRAINING (OUTPATIENT)
Dept: SPORTS MEDICINE | Facility: OTHER | Age: 23
End: 2023-10-11

## 2023-10-11 DIAGNOSIS — G89.29 CHRONIC BILATERAL LOW BACK PAIN WITHOUT SCIATICA: Primary | ICD-10-CM

## 2023-10-11 DIAGNOSIS — M54.50 CHRONIC BILATERAL LOW BACK PAIN WITHOUT SCIATICA: Primary | ICD-10-CM

## 2023-10-11 NOTE — PROGRESS NOTES
10/11  A: Left side low back spasm  S: Pt remembers one movement during open gym that caused pain in his back on 10/9. O: Limited trunk movements  P: TMR and MHP improve rotation and side bends. Extension and flexion still painful.   LB ATC

## 2023-10-17 ENCOUNTER — ATHLETIC TRAINING (OUTPATIENT)
Dept: SPORTS MEDICINE | Facility: OTHER | Age: 23
End: 2023-10-17

## 2023-10-17 DIAGNOSIS — G89.29 CHRONIC BILATERAL LOW BACK PAIN WITHOUT SCIATICA: ICD-10-CM

## 2023-10-17 DIAGNOSIS — M25.511 CHRONIC RIGHT SHOULDER PAIN: Primary | ICD-10-CM

## 2023-10-17 DIAGNOSIS — M54.50 CHRONIC BILATERAL LOW BACK PAIN WITHOUT SCIATICA: ICD-10-CM

## 2023-10-17 DIAGNOSIS — G89.29 CHRONIC RIGHT SHOULDER PAIN: Primary | ICD-10-CM

## 2023-10-19 NOTE — PROGRESS NOTES
Athletic Training Shoulder Evaluation    Name: Iris Feliciano  Age: 24 y.o.   School District: [employer]  Sport: Basketball  Date of Assessment: 10/17/2023    Assessment/Plan:     Visit Diagnosis: Chronic bilateral low back pain without sciatica [M54.50, G89.29]    Treatment Plan:     []  Follow-up PRN. []  Follow-up prior to next practice/game for re-evaluation. [x]  Daily treatment/rehab. Progress note expected weekly.      Referral:     []  Not needed at this time  []  Referred to:     [x]  Coaching staff notified  []  Parent/Guardian Notified    Subjective:    Date of Injury: 10/17/23    Injury occurred during:     [x]  Practice  []  Competition  []  Other:     Mechanism: Overuse     Previous History: Has seen  previously in high school who diagnosed him with rotator cuff pathology    Reported Symptoms:     [] Felt/heard a pop [] Pressure   [] Pain with rest [] Locking   [x] Pain with activity [] Burning   [x] Pain with overhead activity [] Weakness   [] Paresthesia [] Loss of motion   [] Sharp pain [] Crepitus   [] Dull pain [] Clicking   [] Radiating pain [] Popping sensation   [] Felt give way [x] Snapping sensation   [] [place] [] Cervical pain     Objective:    Observation:     [x]  No observable findings compared bilaterally    [] Swelling [] Asymmetry (in motion)   [] Ecchymosis [] Winged scapula   [] Deformity [] Scapular dyskinesis   [] Atrophy [] Uneven shoulders   [] Muscle spasm [] Spine curvature     Palpation: Some tenderness to palpate    Active Range of Motion:      Full  ROM Limited  ROM Pain  with  ROM No  Motion   Shoulder Flexion [x] [] [] []   Shoulder Extension [x] [] [] []   Shoulder Abduction [x] [] [] []   Shoulder Adduction [x] [] [] []   Horizontal Abduction [] [] [] []   Horizontal Adduction [] [] [] []   Internal Rotation  [x] [] [] []   Internal Rotation  [x] [] [] []   Scapular Retraction  [] [] [] []   Scapular Protraction [] [] [] []     Manual Muscle Tests:     Not performed []             5 4+ 4 4- 3 or  Under   Shoulder Flexion [x] [] [] [] []   Shoulder Extension [x] [] [] [] []   Shoulder Abduction [x] [] [] [] []   Shoulder Adduction [x] [] [] [] []   Horizontal Abduction [x] [] [] [] []   Horizontal Adduction [x] [] [] [] []   Internal Rotation  [x] [] [] [] []   Internal Rotation  [x] [] [] [] []     Special Tests:      (+)  POS (-)  NEG Not  Tested   Anterior Apprehension [] [x] []   Relocation [] [x] []   Posterior Apprehension [] [] []   Anterior Load & Shift [] [] []   AC Compression [] [x] []   Sulcus Sign [] [] []   Clunk [] [] []   Crank [] [] []   Drop Arm [] [] []   Empty Can [x] [] []   Amparo's [] [] []   Speed's [] [] []   Dano's [x] [] []   Romain's [] [] []   Irving's [x] [] []   Freddy's [] [] []   Chase's [] [] []     Treatment Log:    Date: 10/17/23   Playing Status: As Tolerated       Exercise/Treatment    Electrical stimulation     Banded shoulder exercises Normal rate, regular rhythm.  Heart sounds S1, S2.  No murmurs, rubs or gallops.

## 2023-11-30 ENCOUNTER — ATHLETIC TRAINING (OUTPATIENT)
Dept: SPORTS MEDICINE | Facility: OTHER | Age: 23
End: 2023-11-30

## 2023-11-30 DIAGNOSIS — M25.511 CHRONIC RIGHT SHOULDER PAIN: Primary | ICD-10-CM

## 2023-11-30 DIAGNOSIS — G89.29 CHRONIC RIGHT SHOULDER PAIN: Primary | ICD-10-CM

## 2023-12-09 NOTE — PROGRESS NOTES
Athletic Training Shoulder Evaluation     Name: Peggy Peñaloza  Age: 24 y.o.   School District: [employer]  Sport: Basketball  Date of Assessment: 10/17/2023     Assessment/Plan:      Visit Diagnosis: Chronic bilateral low back pain without sciatica [M54.50, G89.29]     Treatment Plan:      []  Follow-up PRN. []  Follow-up prior to next practice/game for re-evaluation. [x]  Daily treatment/rehab. Progress note expected weekly.       Referral:      []  Not needed at this time  []  Referred to:      [x]  Coaching staff notified  []  Parent/Guardian Notified     Subjective:     Date of Injury: 10/17/23     Injury occurred during:      [x]  Practice  []  Competition  []  Other:      Mechanism: Overuse      Previous History: Has seen  previously in high school who diagnosed him with rotator cuff pathology     Reported Symptoms:      []  Felt/heard a pop []  Pressure   []  Pain with rest []  Locking   [x]  Pain with activity []  Burning   [x]  Pain with overhead activity []  Weakness   []  Paresthesia []  Loss of motion   []  Sharp pain []  Crepitus   []  Dull pain []  Clicking   []  Radiating pain []  Popping sensation   []  Felt give way [x]  Snapping sensation   []  [place] []  Cervical pain      Objective:     Observation:      [x]  No observable findings compared bilaterally     []  Swelling []  Asymmetry (in motion)   []  Ecchymosis []  Winged scapula   []  Deformity []  Scapular dyskinesis   []  Atrophy []  Uneven shoulders   []  Muscle spasm []  Spine curvature      Palpation: Some tenderness to palpate     Active Range of Motion:        Full  ROM Limited  ROM Pain  with  ROM No  Motion   Shoulder Flexion [x]  []  []  []    Shoulder Extension [x]  []  []  []    Shoulder Abduction [x]  []  []  []    Shoulder Adduction [x]  []  []  []    Horizontal Abduction []  []  []  []    Horizontal Adduction []  []  []  []    Internal Rotation  [x]  []  []  []    Internal Rotation  [x]  []  []  []    Scapular Retraction []  []  []  []    Scapular Protraction []  []  []  []       Manual Muscle Tests:              Not performed []                      5 4+ 4 4- 3 or  Under   Shoulder Flexion [x]  []  []  []  []    Shoulder Extension [x]  []  []  []  []    Shoulder Abduction [x]  []  []  []  []    Shoulder Adduction [x]  []  []  []  []    Horizontal Abduction [x]  []  []  []  []    Horizontal Adduction [x]  []  []  []  []    Internal Rotation  [x]  []  []  []  []    Internal Rotation  [x]  []  []  []  []       Special Tests:        (+)  POS (-)  NEG Not  Tested   Anterior Apprehension []  [x]  []    Relocation []  [x]  []    Posterior Apprehension []  []  []    Anterior Load & Shift []  []  []    AC Compression []  [x]  []    Sulcus Sign []  []  []    Clunk []  []  []    Crank []  []  []    Drop Arm []  []  []    Empty Can [x]  []  []    Amparo's []  []  []    Speed's []  []  []    Bartolokin's [x]  []  []    Neer's []  []  []    [place]'s [x]  []  []    Freddy's []  []  []    Chase's []  []  []       Treatment Log:     Date: 10/17/23   Playing Status: As Tolerated         Exercise/Treatment     Electrical stimulation      Banded shoulder exercises                                                              11/30:  Pt is still coming in for shoulder pain throughout the season.   GRACE

## 2024-01-03 ENCOUNTER — ATHLETIC TRAINING (OUTPATIENT)
Dept: SPORTS MEDICINE | Facility: OTHER | Age: 24
End: 2024-01-03

## 2024-01-03 DIAGNOSIS — S76.212A STRAIN OF LEFT GROIN: Primary | ICD-10-CM

## 2024-01-04 NOTE — PROGRESS NOTES
Athletic Training Hip/Thigh Evaluation     Name: Basil Zarate  Age: 23 y.o.   School District: [employer]  Sport: Mens Basketball  Date of Assessment: 1/3/2024     Assessment/Plan:      Visit Diagnosis: Strain of left groin [S76.212A]     Treatment Plan:     []  Follow-up PRN.   [x]  Follow-up prior to next practice/game for re-evaluation.  [x]  Daily treatment/rehab. Progress note expected weekly.      Referral:      []  Not needed at this time  []  Referred to:      [x]  Coaching staff notified  []  Parent/Guardian Notified      Subjective:     Date of Injury: 1/3/24     Injury occurred during:      []  Practice  [x]  Competition  []  Other:      Mechanism: Overstretching while diving for rebound    Previous History: Pt was hit in groin earlier in the game     Reported Symptoms:      [] Pain with rest [] Pressure   [x] Pain with activity [] Burning   [] Pain with stairs [x] Weakness   [x] Sharp pain [] Loss of motion   [] Dull pain [] Clicking   [] Felt pop [] Snapping sensation   [] Felt give way [] Radiating pain   [] Grinding          Objective:    Observation:      [x]  No observable findings compared bilaterally     [] Swelling [] Genu recurvatum   [] Deformity [] Genu valgum   [] Ecchymosis [] Genu varus   [] Abnormal gait [] Hip anteversion   [] Atrophy [] Hip retroversion   [] Muscle spasm [] Patella abnormality   [] Spine curvature          Palpation: Tender to palpate      Active Range of Motion:        Full  ROM Limited  ROM Pain  with  ROM No  Motion   Hip Flexion  [] [] [x] []   Hip Extension [] [] [] []   Hip Abduction [] [] [] []   Hip Adduction [] [] [x] []   Hip Internal Rotation [] [] [] []   Hip External Rotation [] [] [] []   Knee Flexion [] [] [] []   Knee Extension [] [] [] []      Manual Muscle Tests:      Not performed [x]                     5 4+ 4 4- 3 or  Under   Hip Flexion  [] [] [] [] []   Hip Extension [] [] [] [] []   Hip Abduction [] [] [] [] []   Hip Adduction [] [] [] [] []   Hip  Internal Rotation [] [] [] [] []   Hip External Rotation [] [] [] [] []   Knee Flexion [] [] [] [] []   Knee Extension [] [] [] [] []      Special Tests:        (+)  Tightness (+)  Pain (-)  WNL Not  Tested   Fulcrum [] [] [] []   Ely’s [] [] [] []   Edgardo [] [] [] []   Justin (Modified Edgardo) [] [] [] []   Jamee []  [] [] []   Piriformis [] [] [] []   XENA [] [] [] []   FADIR [] [] [] []   SI Compression/Distraction [] [] [] []     (+)  Clicking (+)  Pain (-)  WNL Not  Tested   Hip Scour [] [] [] []     (+)  POS   (-)  WNL Not  Tested   Long Sit Test [] Leg  Discrepancy [] []   Trendelenberg's  [] Pelvic  Drop [] []       Treatment Log:  Athlete was able to play for 2 minutes after experiencing his injury and pain. His coaching staff eventually took him out of the game. He will come in pre-practice tomorrow for further evaluation.  Date:     Playing Status:           Exercise/Treatment

## 2024-01-05 ENCOUNTER — ATHLETIC TRAINING (OUTPATIENT)
Dept: SPORTS MEDICINE | Facility: OTHER | Age: 24
End: 2024-01-05

## 2024-01-05 DIAGNOSIS — S46.911A STRAIN OF RIGHT SHOULDER, INITIAL ENCOUNTER: Primary | ICD-10-CM

## 2024-01-05 NOTE — PROGRESS NOTES
1/5  A: Right deltoid strain  S: Pt states that his Right shoulder hurt during a rebound at game on 1/3.  O: TTP anterior shoulder  P: Completed loading exercises then high premod.  LB ATC

## 2024-01-27 ENCOUNTER — ATHLETIC TRAINING (OUTPATIENT)
Dept: SPORTS MEDICINE | Facility: OTHER | Age: 24
End: 2024-01-27

## 2024-01-27 DIAGNOSIS — M62.830 BACK MUSCLE SPASM: Primary | ICD-10-CM

## 2024-01-28 NOTE — PROGRESS NOTES
"1/27:  Pt experienced a back muscle spasm from picking up cases of water the morning before his game. He worked with an AT to try and relieve his discomfort before playing. He states \"he was unable to move\".  He received electrical stimulation in the [facility] before his game. He played 40 minutes. We will monitor him throughout the week.  CM   "

## 2024-01-30 ENCOUNTER — ATHLETIC TRAINING (OUTPATIENT)
Dept: SPORTS MEDICINE | Facility: OTHER | Age: 24
End: 2024-01-30

## 2024-01-30 DIAGNOSIS — S93.491A SPRAIN OF ANTERIOR TALOFIBULAR LIGAMENT OF RIGHT ANKLE, INITIAL ENCOUNTER: Primary | ICD-10-CM

## 2024-01-30 NOTE — PROGRESS NOTES
Athletic Training Foot/Ankle Evaluation    Name: Basil Zarate  Age: 24 y.o.   School District: [employer]  Sport: M Basketball  Date of Assessment: 1/30/2024    Assessment/Plan:     Visit Diagnosis: Sprain of anterior talofibular ligament of right ankle, initial encounter [S96.086G]    Treatment Plan:     []  Follow-up PRN.   []  Follow-up prior to next practice/game for re-evaluation.  [x]  Daily treatment/rehab. Progress note expected weekly.     Referral:     []  Not needed at this time  []  Referred to:     [x]  Coaching staff notified  []  Parent/Guardian Notified    Subjective:    Date of Injury: 1/29/24    Injury occurred during:     [x]  Practice  []  Competition  []  Other:     Mechanism: Landing on another players foot    Previous History: Previous ankle pathologies     Reported Symptoms:     [] Felt pop [] Weakness   [] Cracking or snapping [] Grinding   [x] Twisted [x] Sharp pain   [] Pain with rest [] Burning   [] Pain with activity [] Dull or achy   [] Pain with stairs [] Felt give way   [] Numbness or tingling [] Loss of motion     Objective:    Observation:     []  No observable findings compared bilaterally    [x] Swelling [] Callous or blister   [] Ecchymosis [] Nail abnormality   [] Redness [] Ingrown nail   [] Deformity [] Bunion formation   [] Abnormal gait [] Pes planus   [] Pitting edema [] Pes cavus   [] Open wound [] Atrophy     Palpation: TTP over ATFL    Active Range of Motion:      Full  ROM Limited  ROM Pain  with  ROM No  Motion   Dorsiflexion [x] [] [x] []   Plantarflexion [x] [] [x] []   Inversion [x] [] [x] []   Eversion [] [x] [x] []   Great Toe Flexion [] [] [] []   Great Toe Extension [] [] [] []   Toe Flexion [] [] [] []   Toe Extension [] [] [] []     Manual Muscle Tests:     Not performed [x]             5 4+ 4 4- 3 or  Under   Dorsiflexion [] [] [] [] []   Plantarflexion [] [] [] [] []   Inversion [] [] [] [] []   Eversion [] [] [] [] []   Great Toe Flexion [] [] [] []  []   Great Toe Extension [] [] [] [] []   Toe Flexion [] [] [] [] []   Toe Extension [] [] [] [] []     Special Tests:      (+)  Laxity (+)  Pain (-)  WNL Not  Tested   Bump [] [] [x] []   Squeeze [] [] [x] []   Percussion [] [] [] []   Tuning Fork [] [] [] []   Anterior Drawer [] [x] [x] []   Posterior Drawer [] [] [x] []   Talar Tilt - Inversion [] [x] [x] []   Talar Tilt - Eversion [] [x] [x] []   Kleiger [] [] [] []   Toe Compression [] [] [] []   Toe Distraction [] [] [] []   MTP Valgus [] [] [] []   MTP Varus [] [] [] []   Intermetatarsal Glide [] [] [] []   Tarsometatarsal Glide [] [] [] []   Tinel's [] [] [] []   Impingement Sign [] [] [] []   Solo's (Achilles) [] [] [] []   Umm's Sign (DVT) [] [] [] []   Interdigital Neuroma [] [] [] []   Navicular Drop [] [] [] []     Treatment Log:    Date: 1/30/23   Playing Status: As tolerated       Exercise/Treatment    Effleurage Massage X   Joint Mobs

## 2024-02-03 ENCOUNTER — ATHLETIC TRAINING (OUTPATIENT)
Dept: SPORTS MEDICINE | Facility: OTHER | Age: 24
End: 2024-02-03

## 2024-02-03 DIAGNOSIS — S93.491A SPRAIN OF ANTERIOR TALOFIBULAR LIGAMENT OF RIGHT ANKLE, INITIAL ENCOUNTER: Primary | ICD-10-CM

## 2024-02-04 NOTE — PROGRESS NOTES
Athletic Training Foot/Ankle Evaluation     Name: Basil Zarate  Age: 24 y.o.   School District: [employer]  Sport: M Basketball  Date of Assessment: 1/30/2024     Assessment/Plan:      Visit Diagnosis: Sprain of anterior talofibular ligament of right ankle, initial encounter [S94.904O]     Treatment Plan:      []  Follow-up PRN.   []  Follow-up prior to next practice/game for re-evaluation.  [x]  Daily treatment/rehab. Progress note expected weekly.      Referral:      []  Not needed at this time  []  Referred to:      [x]  Coaching staff notified  []  Parent/Guardian Notified     Subjective:     Date of Injury: 1/29/24     Injury occurred during:      [x]  Practice  []  Competition  []  Other:      Mechanism: Landing on another players foot     Previous History: Previous ankle pathologies      Reported Symptoms:      []  Felt pop []  Weakness   []  Cracking or snapping []  Grinding   [x]  Twisted [x]  Sharp pain   []  Pain with rest []  Burning   []  Pain with activity []  Dull or achy   []  Pain with stairs []  Felt give way   []  Numbness or tingling []  Loss of motion      Objective:     Observation:      []  No observable findings compared bilaterally     [x]  Swelling []  Callous or blister   []  Ecchymosis []  Nail abnormality   []  Redness []  Ingrown nail   []  Deformity []  Bunion formation   []  Abnormal gait []  Pes planus   []  Pitting edema []  Pes cavus   []  Open wound []  Atrophy      Palpation: TTP over ATFL     Active Range of Motion:        Full  ROM Limited  ROM Pain  with  ROM No  Motion   Dorsiflexion [x]  []  [x]  []    Plantarflexion [x]  []  [x]  []    Inversion [x]  []  [x]  []    Eversion []  [x]  [x]  []    Great Toe Flexion []  []  []  []    Great Toe Extension []  []  []  []    Toe Flexion []  []  []  []    Toe Extension []  []  []  []       Manual Muscle Tests:              Not performed [x]                      5 4+ 4 4- 3 or  Under   Dorsiflexion []  []  []  []  []     Plantarflexion []  []  []  []  []    Inversion []  []  []  []  []    Eversion []  []  []  []  []    Great Toe Flexion []  []  []  []  []    Great Toe Extension []  []  []  []  []    Toe Flexion []  []  []  []  []    Toe Extension []  []  []  []  []       Special Tests:        (+)  Laxity (+)  Pain (-)  WNL Not  Tested   Bump []  []  [x]  []    Squeeze []  []  [x]  []    Percussion []  []  []  []    Tuning Fork []  []  []  []    Anterior Drawer []  [x]  [x]  []    Posterior Drawer []  []  [x]  []    Talar Tilt - Inversion []  [x]  [x]  []    Talar Tilt - Eversion []  [x]  [x]  []    Kleiger []  []  []  []    Toe Compression []  []  []  []    Toe Distraction []  []  []  []    MTP Valgus []  []  []  []    MTP Varus []  []  []  []    Intermetatarsal Glide []  []  []  []    Tarsometatarsal Glide []  []  []  []    Tinel's []  []  []  []    Impingement Sign []  []  []  []    Solo's (Achilles) []  []  []  []    Umm's Sign (DVT) []  []  []  []    Interdigital Neuroma []  []  []  []    Navicular Drop []  []  []  []       Treatment Log:     Date: 1/30/23   Playing Status: As tolerated         Exercise/Treatment     Effleurage Massage X   Joint Mobs                                                             2/2:  Visible swelling starting to be seen around lateral ankle. We completed effluerage massage before competing this afternoon.  GRACE

## 2024-02-14 ENCOUNTER — ATHLETIC TRAINING (OUTPATIENT)
Dept: SPORTS MEDICINE | Facility: OTHER | Age: 24
End: 2024-02-14

## 2024-02-14 DIAGNOSIS — S80.01XA CONTUSION OF RIGHT KNEE, INITIAL ENCOUNTER: Primary | ICD-10-CM

## 2024-02-14 NOTE — PROGRESS NOTES
Athletic Training Knee Evaluation    Name: Basil Zarate  Age: 24 y.o.   School District: [employer]  Sport: Men's Basketball  Date of Assessment: 2/14/2024    Assessment/Plan:     Visit Diagnosis: Contusion of right knee, initial encounter [S80.01XA]    Treatment Plan:     []  Follow-up PRN.   [x]  Follow-up prior to next practice/game for re-evaluation.  []  Daily treatment/rehab. Progress note expected weekly.     Referral:     []  Not needed at this time  []  Referred to:     []  Coaching staff notified  []  Parent/Guardian Notified    Subjective:    Date of Injury: 2/13/24    Injury occurred during:     []  Practice  [x]  Competition  []  Other:     Mechanism: Knee to knee contact with another player    Previous History: None    Reported Symptoms:     [] Felt pop [] Grinding   [] Park a pop [] Pressure   [] Pain with rest [] Burning   [x] Pain with activity [x] Weakness   [] Pain with stairs [] Loss of motion   [] Sharp pain [] Clicking   [x] Dull pain [] Snapping sensation   [] Radiating pain [] Locking   [] Felt give way       Objective:    Observation:     [x]  No observable findings compared bilaterally    [] Swelling [] Genu recurvatum   [] Effusion [] Genu valgum   [] Deformity [] Genu varus   [] Ecchymosis [] Patella kyle   [] Abnormal gait [] Patella baja   [] Atrophy [] Squinting patellae   [] Muscle spasm [] “Frog eye” patellae     Palpation: TTP of medial aspect of knee, near adductor insertion    Active Range of Motion:      Full  ROM Limited  ROM Pain  with  ROM No  Motion   Knee Flexion [x] [] [] []   Knee Extension [x] [] [] []   Hip Flexion [x] [] [] []   Hip Extension [x] [] [] []   Hip Abduction [x] [] [] []   Hip Adduction [x] [] [] []   Dorsiflexion [] [] [] []   Plantarflexion [] [] [] []     Manual Muscle Tests:     Not performed [x]             5 4+ 4 4- 3 or  Under   Knee Flexion [] [] [] [] []   Knee Extension [] [] [] [] []   Hip Flexion [] [] [] [] []   Hip Extension [] [] []  [] []   Hip Abduction [] [] [] [] []   Hip Adduction [] [] [] [] []   Dorsiflexion [] [] [] [] []   Plantarflexion [] [] [] [] []     Special Tests:      (+)  Laxity (+)  Pain (-)  WNL Not  Tested   Lachman [] [] [x] []   Anterior Drawer [] [] [x] []   Pivot Shift [] [] [] []   Posterior Drawer [] [] [x] []   Sag [] [] [x] []   Valgus (0 Degrees) [] [] [x] []   Valgus (30 Degrees) [] [] [x] []   Varus (0 Degrees) [] [] [x] []   Varus (30 Degrees) [] [] [x] []   Nimo [] [] [x] []   Thessanupama's [] [] [x] []   Arviney's [] [] [] []   Jamee's [] [] [] []   Patellar Apprehension [] [] [] []   Patellar Glide [] [] [] []   Ballotable Patella  [] [] [] []     Treatment Log:    Date: 2/14/23   Playing Status: As tolerated       Exercise/Treatment    Bike Riding 10 mins forward/backward   Therm-x 20 mins